# Patient Record
(demographics unavailable — no encounter records)

---

## 2024-10-17 NOTE — PHYSICAL EXAM
[No Acute Distress] : in no acute distress [Well developed] : well developed [Well Nourished] : ~L well nourished [Good Hygeine] : demonstrates good hygeine [Oriented x3] : oriented to person, place, and time [Normal Memory] : ~T memory was ~L unimpaired [Normal Mood/Affect] : mood and affect are normal [Anxiety] : patient is not anxious [Warm and Dry] : was warm and dry to touch [Normal Gait] : gait was normal [Labia Majora] : were normal [Labia Minora] : were normal [Normal] : was normal [Atrophy] : atrophy [No Bleeding] : there was no active vaginal bleeding

## 2024-10-17 NOTE — HISTORY OF PRESENT ILLNESS
[FreeTextEntry1] : Virginia is a 80 y/o that presents for follow up of overactive bladder, urge incontinence and stress incontinence. She failed Solifenacin and Myrbetriq and was started on Trospium on 03/28/24.  Pt reports improvement but last few days she has been urinating more, especially at night.  She also feels intermittent pain with urination.  She denies cloudy or foul-smelling urine, hematuria, back pain, fever, or chills.  She reports that she tries to adhere to diet and behavioral modifications. She denies any side effects except some increased constipation that she takes prunes for and she would like to continue with Trospium for now.

## 2024-10-17 NOTE — DISCUSSION/SUMMARY
[FreeTextEntry1] : #Overactive bladder, urgency incontinence: -Continue behavioral and fluid modification -Continue with  Trospium ER 60mg.  Take MiraLAX and/or Colace as needed in addition to the prunes if needed for constipation.   #r/o UTI: - Udip significant for moderate leukocytes.  Will send out for formal UA and UCx and follow-up. - Pt would like to wait for UCx results before starting antibiotics. - Pt may take Azo / Uristat for symptoms.  Continue to hydrate well.  - Routine perineal care reviewed.  - Reviewed s/s of worsening UTI symptoms such as hematuria, fever, chills and back pain.  Pt knows to contact our office or present to ED for such symptoms.   RTO in one year or sooner as needed. All questions answered to pt satisfaction.  Pt can call the office with any additional questions or concerns; pt verbalized understanding.

## 2024-10-17 NOTE — PROCEDURE
[Straight Catheterization] : insertion of a straight catheter [Urinary Tract Infection] : a urinary tract infection [Patient] : the patient [Allergies Reviewed] : Allergies reviewed [None] : none [___ Fr Straight Tip] : a [unfilled] in [place] straight tip catheter [Cloudy] : cloudy [Culture] : culture [Urinalysis] : urinalysis [No Complications] : no complications [Tolerated Well] : the patient tolerated the procedure well [Post procedure instructions and information given] : Post procedure instructions and information were given and reviewed with patient. [0] : 0